# Patient Record
Sex: MALE | Race: WHITE | NOT HISPANIC OR LATINO
[De-identification: names, ages, dates, MRNs, and addresses within clinical notes are randomized per-mention and may not be internally consistent; named-entity substitution may affect disease eponyms.]

---

## 2022-06-04 PROBLEM — Z00.00 ENCOUNTER FOR PREVENTIVE HEALTH EXAMINATION: Status: ACTIVE | Noted: 2022-06-04

## 2022-06-06 ENCOUNTER — NON-APPOINTMENT (OUTPATIENT)
Age: 22
End: 2022-06-06

## 2022-06-06 ENCOUNTER — APPOINTMENT (OUTPATIENT)
Dept: FAMILY MEDICINE | Facility: CLINIC | Age: 22
End: 2022-06-06
Payer: COMMERCIAL

## 2022-06-06 VITALS
SYSTOLIC BLOOD PRESSURE: 114 MMHG | HEIGHT: 70 IN | DIASTOLIC BLOOD PRESSURE: 80 MMHG | BODY MASS INDEX: 30.49 KG/M2 | WEIGHT: 213 LBS | HEART RATE: 66 BPM

## 2022-06-06 DIAGNOSIS — Z83.3 FAMILY HISTORY OF DIABETES MELLITUS: ICD-10-CM

## 2022-06-06 DIAGNOSIS — Z82.49 FAMILY HISTORY OF ISCHEMIC HEART DISEASE AND OTHER DISEASES OF THE CIRCULATORY SYSTEM: ICD-10-CM

## 2022-06-06 DIAGNOSIS — Z87.09 PERSONAL HISTORY OF OTHER DISEASES OF THE RESPIRATORY SYSTEM: ICD-10-CM

## 2022-06-06 DIAGNOSIS — Z78.9 OTHER SPECIFIED HEALTH STATUS: ICD-10-CM

## 2022-06-06 DIAGNOSIS — Z72.3 LACK OF PHYSICAL EXERCISE: ICD-10-CM

## 2022-06-06 DIAGNOSIS — Z76.89 PERSONS ENCOUNTERING HEALTH SERVICES IN OTHER SPECIFIED CIRCUMSTANCES: ICD-10-CM

## 2022-06-06 DIAGNOSIS — L64.9 ANDROGENIC ALOPECIA, UNSPECIFIED: ICD-10-CM

## 2022-06-06 DIAGNOSIS — S39.92XA UNSPECIFIED INJURY OF LOWER BACK, INITIAL ENCOUNTER: ICD-10-CM

## 2022-06-06 DIAGNOSIS — Z87.81 PERSONAL HISTORY OF (HEALED) TRAUMATIC FRACTURE: ICD-10-CM

## 2022-06-06 DIAGNOSIS — S42.302A UNSPECIFIED FRACTURE OF SHAFT OF HUMERUS, LEFT ARM, INITIAL ENCOUNTER FOR CLOSED FRACTURE: ICD-10-CM

## 2022-06-06 DIAGNOSIS — Z83.438 FAMILY HISTORY OF OTHER DISORDER OF LIPOPROTEIN METABOLISM AND OTHER LIPIDEMIA: ICD-10-CM

## 2022-06-06 PROCEDURE — 99203 OFFICE O/P NEW LOW 30 MIN: CPT

## 2022-06-06 NOTE — HEALTH RISK ASSESSMENT
[Yes] : Yes [2 - 4 times a month (2 pts)] : 2-4 times a month (2 points) [3 or 4 (1 pt)] : 3 or 4  (1 point) [Never (0 pts)] : Never (0 points) [No] : In the past 12 months have you used drugs other than those required for medical reasons? No [No falls in past year] : Patient reported no falls in the past year [0] : 2) Feeling down, depressed, or hopeless: Not at all (0) [PHQ-2 Negative - No further assessment needed] : PHQ-2 Negative - No further assessment needed [de-identified] : vap [de-identified] : none [de-identified] : normal [[id number]] : 0

## 2022-06-06 NOTE — REVIEW OF SYSTEMS
[Recent Change In Weight] : ~T recent weight change [Negative] : Constitutional [FreeTextEntry2] : Loss of weight through change of diet. [FreeTextEntry1] : hair loss as per HPI

## 2022-06-06 NOTE — HISTORY OF PRESENT ILLNESS
[FreeTextEntry8] : Mr. LEONARDO KRISHNAN is a 21 year old male here today for hair loss. Started about two years ago. Says his maternal GF is bald. \par Otherwise healthy\par UTD with vaccinations.\par

## 2022-06-06 NOTE — PLAN
[FreeTextEntry1] : F/U for complete physical\par 3 month f/u recommended when he starts finasteride.

## 2022-07-01 ENCOUNTER — APPOINTMENT (OUTPATIENT)
Dept: FAMILY MEDICINE | Facility: CLINIC | Age: 22
End: 2022-07-01

## 2022-07-11 ENCOUNTER — APPOINTMENT (OUTPATIENT)
Dept: FAMILY MEDICINE | Facility: CLINIC | Age: 22
End: 2022-07-11

## 2022-08-15 ENCOUNTER — APPOINTMENT (OUTPATIENT)
Dept: FAMILY MEDICINE | Facility: CLINIC | Age: 22
End: 2022-08-15

## 2023-05-31 ENCOUNTER — NON-APPOINTMENT (OUTPATIENT)
Age: 23
End: 2023-05-31

## 2023-05-31 NOTE — ASSESSMENT
[FreeTextEntry1] : \par \par \par \par \par \par The patient has   NOT COME    to the VISIT \par \par This Assessment  is  in a note Titled:  Non - Appointment    which  only reflects a summary and review of records\par The Assessment below is tentative and preliminary.  It is based only\par on information gathered from chart review and will need modification once the History is taken from the patient and the patient is examined.\par \par \par \par 1. Bloody Diarrhea\par    assoc w\par \par R/O colitis\par       Infectious\par       IBD\par P: \par check the following:\par Labs: cbc, cmet, esr, crp, iron/celiac/IBD/food allergy panels, B12, FA, TSH, \par           Stool: Calprotectin, O/P, C/S, C.Diff, \par \par Recommend: \par * Diet:  Low Fiber,  Low Fat & Lactose free, Low FODMAPs--was reviewed & emphasized\par * Daily fluid intake was reviewed : 6  --  8 cups of decaffeinated fluid daily was emphasized\par * Regular aerobic exercise was emphasized\par * Probiotics  1  daily\par \par blood drawn in the office\par \par \par \par \par \par \par \par Informed Consent:\par * The risks & Benefits of  Colonoscopy were discussed w patient.\par * This included but was not limited to perforation, bleeding, sedation /med rxns, missed lesions possibly requiring surgery, blood transfusions, antibiotics & CPR/Intubation.\par * Pt. understands & agrees to the procedures.\par The following instructions in regards to the prep and medically essential ( cardiac, pulmonary, sz, psych, endocrine)  pre-op medication administration\par was reviewed and emphasized with the patient . \par * Pt. advised to D/C  ASA/NSAIDs  7  Days  PTP.\par * [ +++ ]  Dulcolax / Miralax / Mag. Citrate ,  [     ] Prepopik/ Clenpiq ,  [     ] Osmo Prep,  [    ] GoLytely,  prep. reviewed w Pt.\par * Hold  [           ] AM of procedure.\par * Hold  [           ] PM  before procedure.\par * Take  [           ] PM  before procedure.\par * Take  [           ] AM of procedure.\par \par

## 2023-05-31 NOTE — HISTORY OF PRESENT ILLNESS
[FreeTextEntry1] : \par \par  \par \par \par The patient   DID NOT COME   for the visit.\par \par This HPI is  in a note Titled:  Non - Appointment   and reflects a summary and review of records : including previous and most recent  Labs, body imaging, consults and progress notes, operative and pathology reports, EKG reports, ED records, found in [employer], [employer],  [employer] and any additional records brought in by  the patient at the time of the visit.\par \par It is for History taking purposes\par \par PCP: Helen\par \par 21 yo m w h/o Asthma, LBP\par \par 6/12/23 :    Today:  Feeling well, no c/o , CP, SOB/ LAI, Cough, Wheeze, Palpitations, edema\par \par pt presented to [facility] [place] ED on 4/25/23     c/o bloody diarrhea \par \par \par 6/12/23   Today: \par \par * Abd pain-->no\par * Nausea--> no\par * Vomit--> no\par * Early satiety--> no\par * Belching--> no\par * Hiccups--> no\par * Regurgitation--> no\par * Acid Taste / Water Brash--> no\par * Ht burn--> no\par * Dysphagia--> no\par * Throat Clearing--> no\par * Hoarseness--> no\par * Post-Nasal Drip--> no\par * Congestion--> no\par * Globus--> no\par * Cough--> no\par * Wheeze / PC-> -no\par * BMs: # 4 qd\par * Constipation--> no\par * Diarrhea--> no\par * Bloating--> no\par * Strain on Defecation--> no\par * Incompl Evac--> no\par * Flatulence--> no\par * Gurgling--> no\par * Melena--> no\par * BPBPR-> -no\par * Anorexia--> no\par * Wt. Loss--> no\par \par

## 2023-05-31 NOTE — PHYSICAL EXAM
[Sclera] : the sclera and conjunctiva were normal [Normal Appearance] : the appearance of the neck was normal [None] : no edema [Normal] : normal bowel sounds, non-tender, no masses, soft, no no hepato-splenomegaly [Abnormal Walk] : normal gait [Normal Color / Pigmentation] : normal skin color and pigmentation [Oriented To Time, Place, And Person] : oriented to person, place, and time

## 2023-05-31 NOTE — REVIEW OF SYSTEMS
[As Noted in HPI] : as noted in HPI [Negative] : Respiratory [Scleral Icterus (Yellow Eyes)] : no scleral icterus [Skin Lesions] : no skin lesions [Confused] : no confusion [Easy Bruising] : no tendency for easy bruising

## 2023-06-02 ENCOUNTER — APPOINTMENT (OUTPATIENT)
Dept: GASTROENTEROLOGY | Facility: CLINIC | Age: 23
End: 2023-06-02
Payer: COMMERCIAL

## 2023-06-02 VITALS
SYSTOLIC BLOOD PRESSURE: 132 MMHG | DIASTOLIC BLOOD PRESSURE: 69 MMHG | OXYGEN SATURATION: 98 % | HEIGHT: 71 IN | HEART RATE: 82 BPM | BODY MASS INDEX: 32.2 KG/M2 | WEIGHT: 230 LBS

## 2023-06-02 DIAGNOSIS — R19.7 DIARRHEA, UNSPECIFIED: ICD-10-CM

## 2023-06-02 PROCEDURE — 99204 OFFICE O/P NEW MOD 45 MIN: CPT | Mod: 25

## 2023-06-02 PROCEDURE — 36415 COLL VENOUS BLD VENIPUNCTURE: CPT

## 2023-06-02 NOTE — CONSULT LETTER
[Dear  ___] : Dear  [unfilled], [Consult Letter:] : I had the pleasure of evaluating your patient, [unfilled]. [Please see my note below.] : Please see my note below. [Consult Closing:] : Thank you very much for allowing me to participate in the care of this patient.  If you have any questions, please do not hesitate to contact me. [Sincerely,] : Sincerely, [FreeTextEntry3] : Dylan Bush MD\par tel: [phone]\par fax: [phone]\par

## 2023-06-02 NOTE — PHYSICAL EXAM
[Alert] : alert [Sclera] : the sclera and conjunctiva were normal [Hearing Threshold Finger Rub Not [place]] : hearing was normal [Normal Appearance] : the appearance of the neck was normal [No Respiratory Distress] : no respiratory distress [None] : no edema [Abdomen Soft] : soft [No Focal Deficits] : no focal deficits [Oriented To Time, Place, And Person] : oriented to person, place, and time [de-identified] : Deferred pending colonoscopy

## 2023-06-02 NOTE — ASSESSMENT
[FreeTextEntry1] : Chronic diarrhea: Etiology unclear. Labs / stool studies ordered. Colonoscopy with random biopsies planned\par \par Pertinent available records reviewed\par Risks of the procedure including but not limited to bleeding / perforation / infection / anesthesia complication / missed polyp or lesion explained to the  patient . The patient expressed understanding and a desire to proceed with the procedure.\par \par Risk of not doing procedure includes but is not limited to missed or delayed diagnosis of gastrointestinal pathology.\par A consultation note was provided to the referring provider\par The ordered labs/ bloods  were drawn / collected in my office\par

## 2023-06-04 LAB
ALBUMIN SERPL ELPH-MCNC: 4.6 G/DL
ALP BLD-CCNC: 66 U/L
ALT SERPL-CCNC: 31 U/L
ANION GAP SERPL CALC-SCNC: 17 MMOL/L
AST SERPL-CCNC: 21 U/L
BAKER'S YEAST AB QL: 8 UNITS
BAKER'S YEAST IGA QL IA: 17.9 UNITS
BAKER'S YEAST IGA QN IA: NEGATIVE
BAKER'S YEAST IGG QN IA: NEGATIVE
BILIRUB DIRECT SERPL-MCNC: 0.1 MG/DL
BILIRUB INDIRECT SERPL-MCNC: 0.3 MG/DL
BILIRUB SERPL-MCNC: 0.4 MG/DL
BUN SERPL-MCNC: 15 MG/DL
CALCIUM SERPL-MCNC: 9.9 MG/DL
CDIFF BY PCR: NOT DETECTED
CHLORIDE SERPL-SCNC: 104 MMOL/L
CO2 SERPL-SCNC: 23 MMOL/L
CREAT SERPL-MCNC: 0.9 MG/DL
CRP SERPL-MCNC: 5 MG/L
EGFR: 125 ML/MIN/1.73M2
ERYTHROCYTE [SEDIMENTATION RATE] IN BLOOD BY WESTERGREN METHOD: 34 MM/HR
FOLATE SERPL-MCNC: 5.8 NG/ML
GLUCOSE SERPL-MCNC: 107 MG/DL
POTASSIUM SERPL-SCNC: 4.2 MMOL/L
PROT SERPL-MCNC: 7.6 G/DL
SODIUM SERPL-SCNC: 144 MMOL/L
TSH SERPL-ACNC: 1.63 UIU/ML
VIT B12 SERPL-MCNC: 687 PG/ML

## 2023-06-05 LAB
BACTERIA STL CULT: NORMAL
ENDOMYSIUM IGA SER QL: NEGATIVE
ENDOMYSIUM IGA TITR SER: NORMAL
G LAMBLIA AG STL QL: NORMAL
GLIADIN IGA SER QL: 17.8 UNITS
GLIADIN IGG SER QL: 5.1 UNITS
GLIADIN PEPTIDE IGA SER-ACNC: NEGATIVE
GLIADIN PEPTIDE IGG SER-ACNC: NEGATIVE
TTG IGA SER IA-ACNC: 2.4 U/ML
TTG IGA SER-ACNC: NEGATIVE
TTG IGG SER IA-ACNC: 4 U/ML
TTG IGG SER IA-ACNC: NEGATIVE

## 2023-06-06 LAB — DEPRECATED O AND P PREP STL: NORMAL

## 2023-06-07 LAB
MYELOPEROXIDASE AB SER QL IA: <5 UNITS
MYELOPEROXIDASE CELLS FLD QL: NEGATIVE

## 2023-06-08 LAB — E HISTOLYT AG STL IA-ACNC: NOT DETECTED

## 2023-06-09 ENCOUNTER — NON-APPOINTMENT (OUTPATIENT)
Age: 23
End: 2023-06-09

## 2023-06-12 ENCOUNTER — RESULT REVIEW (OUTPATIENT)
Age: 23
End: 2023-06-12

## 2023-06-12 ENCOUNTER — APPOINTMENT (OUTPATIENT)
Dept: GASTROENTEROLOGY | Facility: CLINIC | Age: 23
End: 2023-06-12

## 2023-06-12 LAB — A1AT STL-MCNC: 0.55 MG/G

## 2023-06-13 ENCOUNTER — APPOINTMENT (OUTPATIENT)
Dept: GASTROENTEROLOGY | Facility: HOSPITAL | Age: 23
End: 2023-06-13

## 2023-06-13 ENCOUNTER — TRANSCRIPTION ENCOUNTER (OUTPATIENT)
Age: 23
End: 2023-06-13

## 2023-06-14 LAB — CALPROTECTIN FECAL: 3370 UG/G

## 2023-06-15 LAB — PANCREATIC ELASTASE, FECAL: 136 CD:794062645

## 2023-06-28 ENCOUNTER — APPOINTMENT (OUTPATIENT)
Dept: GASTROENTEROLOGY | Facility: CLINIC | Age: 23
End: 2023-06-28
Payer: COMMERCIAL

## 2023-06-28 VITALS
SYSTOLIC BLOOD PRESSURE: 128 MMHG | BODY MASS INDEX: 32.2 KG/M2 | DIASTOLIC BLOOD PRESSURE: 82 MMHG | HEIGHT: 71 IN | OXYGEN SATURATION: 98 % | WEIGHT: 230 LBS | HEART RATE: 111 BPM

## 2023-06-28 PROCEDURE — 99214 OFFICE O/P EST MOD 30 MIN: CPT

## 2023-06-28 RX ORDER — FINASTERIDE 1 MG/1
1 TABLET ORAL DAILY
Qty: 90 | Refills: 3 | Status: DISCONTINUED | COMMUNITY
Start: 2022-06-06 | End: 2023-06-28

## 2023-06-28 NOTE — PHYSICAL EXAM
[Alert] : alert [Sclera] : the sclera and conjunctiva were normal [Hearing Threshold Finger Rub Not [place]] : hearing was normal [Normal Appearance] : the appearance of the neck was normal [No Respiratory Distress] : no respiratory distress [None] : no edema [Abdomen Soft] : soft [No Focal Deficits] : no focal deficits [Oriented To Time, Place, And Person] : oriented to person, place, and time [de-identified] : Deferred pending colonoscopy

## 2023-06-28 NOTE — HISTORY OF PRESENT ILLNESS
[FreeTextEntry1] : Presents for follow up after colonoscopy that was c/w Ulcerative Colitis  On steroid taper taper ..currently 10 mg daily with 1-2 formed  /  non urgent / nonbloody / painless bowel movement.\par \par - Colonoscopy 6/15/2023 revealed acute colitis / cryptitis / glandular distortion throughout colon  3  months of watery / semi-solid diarrhea 5-10 times daily  / occasional BRBPR on the toilet paper. \par \par Stool studies were negative for infection\par Calprotectin was 3370 / ESR = 34 / CRP = 5\par \par Steroid taper started after colonoscopy with plan to try Mesalamine \par \par Denies nausea, vomiting, fever, chills, melena, hematemesis, unexpected weight loss, reflux\par

## 2023-06-28 NOTE — CONSULT LETTER
[Dear  ___] : Dear  [unfilled], [Courtesy Letter:] : I had the pleasure of seeing your patient, [unfilled], in my office today. [Please see my note below.] : Please see my note below. [Consult Closing:] : Thank you very much for allowing me to participate in the care of this patient.  If you have any questions, please do not hesitate to contact me. [Sincerely,] : Sincerely, [FreeTextEntry3] : Dylan Bush MD\par tel: [phone]\par fax: [phone]\par

## 2023-07-30 ENCOUNTER — NON-APPOINTMENT (OUTPATIENT)
Age: 23
End: 2023-07-30

## 2023-07-31 ENCOUNTER — APPOINTMENT (OUTPATIENT)
Age: 23
End: 2023-07-31
Payer: COMMERCIAL

## 2023-08-16 LAB
CDIFF BY PCR: NOT DETECTED
GI PCR PANEL: NOT DETECTED

## 2023-08-27 LAB — CALPROTECTIN FECAL: 1100 UG/G

## 2023-08-28 ENCOUNTER — APPOINTMENT (OUTPATIENT)
Dept: GASTROENTEROLOGY | Facility: CLINIC | Age: 23
End: 2023-08-28
Payer: COMMERCIAL

## 2023-08-28 DIAGNOSIS — K52.9 NONINFECTIVE GASTROENTERITIS AND COLITIS, UNSPECIFIED: ICD-10-CM

## 2023-08-28 PROCEDURE — 99443: CPT

## 2023-08-28 NOTE — HISTORY OF PRESENT ILLNESS
[FreeTextEntry1] : Telephonic visit:  Patient back to candice stools after 2 nd steroid taper since diagnosis. Patient started on a steroid taper on August 14, 2023 (increased watery / non bloody stools ) .  Stool studies were negative for C. diff / calprotectin was 1100.  At 6/2023 evaluation after colonoscopy that was c/w Ulcerative Colitis was on n steroid taper with 1-2 formed / non urgent / non bloody / painless bowel movement. Apriso started with plan to check calprotectin in 3 months  - Colonoscopy 6/15/2023 revealed acute colitis / cryptitis / glandular distortion throughout colon 3 months of watery / semi-solid diarrhea 5-10 times daily / occasional BRBPR on the toilet paper.   Stool studies were negative for infection. Calprotectin was 3370 / ESR = 34 / CRP = 5  Steroid taper started after colonoscopy with plan to try Mesalamine   Denies nausea, vomiting, fever, chills, melena, hematemesis, unexpected weight loss, reflux

## 2023-08-28 NOTE — ASSESSMENT
[FreeTextEntry1] : Ulcerative Colitis..responding to steroid taper # 2. Continue Apriso...if flares again, will need to consider a biolgic Office in 1  month / calprotectin in 3 months Labs every 6 months Colonoscopy every 1-2 years after 8 years of disease  Pertinent available records reviewed

## 2023-09-07 RX ORDER — PREDNISONE 10 MG/1
10 TABLET ORAL
Qty: 58 | Refills: 0 | Status: ACTIVE | COMMUNITY
Start: 2023-06-13 | End: 1900-01-01

## 2023-09-21 ENCOUNTER — LABORATORY RESULT (OUTPATIENT)
Age: 23
End: 2023-09-21

## 2023-09-28 ENCOUNTER — LABORATORY RESULT (OUTPATIENT)
Age: 23
End: 2023-09-28

## 2023-10-11 ENCOUNTER — APPOINTMENT (OUTPATIENT)
Dept: GASTROENTEROLOGY | Facility: CLINIC | Age: 23
End: 2023-10-11

## 2023-10-11 RX ORDER — MESALAMINE 0.38 G/1
0.38 CAPSULE, EXTENDED RELEASE ORAL
Qty: 120 | Refills: 3 | Status: ACTIVE | COMMUNITY
Start: 2023-06-28 | End: 1900-01-01

## 2023-10-11 RX ORDER — PREDNISONE 10 MG/1
10 TABLET ORAL DAILY
Qty: 30 | Refills: 0 | Status: ACTIVE | COMMUNITY
Start: 2023-10-11 | End: 1900-01-01

## 2023-10-11 RX ORDER — MESALAMINE 1.2 G/1
1.2 TABLET, DELAYED RELEASE ORAL DAILY
Qty: 60 | Refills: 6 | Status: DISCONTINUED | COMMUNITY
Start: 2023-06-28 | End: 2023-10-11

## 2023-10-19 ENCOUNTER — NON-APPOINTMENT (OUTPATIENT)
Age: 23
End: 2023-10-19

## 2023-10-24 ENCOUNTER — APPOINTMENT (OUTPATIENT)
Dept: GASTROENTEROLOGY | Facility: CLINIC | Age: 23
End: 2023-10-24
Payer: COMMERCIAL

## 2023-10-24 VITALS
HEIGHT: 69 IN | DIASTOLIC BLOOD PRESSURE: 80 MMHG | OXYGEN SATURATION: 96 % | HEART RATE: 129 BPM | SYSTOLIC BLOOD PRESSURE: 124 MMHG

## 2023-10-24 PROCEDURE — 99214 OFFICE O/P EST MOD 30 MIN: CPT

## 2023-10-24 RX ORDER — PREDNISONE 2.5 MG/1
2.5 TABLET ORAL DAILY
Qty: 5 | Refills: 0 | Status: ACTIVE | COMMUNITY
Start: 2023-10-24 | End: 1900-01-01

## 2023-10-24 RX ORDER — PREDNISONE 5 MG/1
5 TABLET ORAL DAILY
Qty: 5 | Refills: 0 | Status: ACTIVE | COMMUNITY
Start: 2023-10-24 | End: 1900-01-01

## 2023-10-24 RX ORDER — MESALAMINE 0.38 G/1
0.38 CAPSULE, EXTENDED RELEASE ORAL
Qty: 120 | Refills: 0 | Status: ACTIVE | COMMUNITY
Start: 2023-10-24 | End: 1900-01-01

## 2023-11-08 ENCOUNTER — APPOINTMENT (OUTPATIENT)
Dept: GASTROENTEROLOGY | Facility: CLINIC | Age: 23
End: 2023-11-08

## 2023-11-14 RX ORDER — ADALIMUMAB 40MG/0.4ML
40 KIT SUBCUTANEOUS
Qty: 1 | Refills: 6 | Status: ACTIVE | COMMUNITY
Start: 2023-10-02 | End: 1900-01-01

## 2023-11-21 ENCOUNTER — APPOINTMENT (OUTPATIENT)
Dept: GASTROENTEROLOGY | Facility: CLINIC | Age: 23
End: 2023-11-21

## 2023-11-22 ENCOUNTER — NON-APPOINTMENT (OUTPATIENT)
Age: 23
End: 2023-11-22

## 2023-11-22 ENCOUNTER — APPOINTMENT (OUTPATIENT)
Dept: GASTROENTEROLOGY | Facility: CLINIC | Age: 23
End: 2023-11-22

## 2023-11-30 ENCOUNTER — LABORATORY RESULT (OUTPATIENT)
Age: 23
End: 2023-11-30

## 2023-12-15 NOTE — HISTORY OF PRESENT ILLNESS
[FreeTextEntry1] : LEONARDO KRISHNAN  is being evaluated at the request of Dr. Salgado  for an opinion re:diarrhea.  Presents  with 3  months of watery / semi-solid diarrhea 5-10 times daily . Admits to nocturnal diarrhea as well. Admits to occasional BRBPR on the toilet paper. Admits to cramping prior to diarrhea. Admits to urgency as well. No recent travel / antibiotics / new meds. Diarrhea controlle dwith Imodium\par \par Denies nausea, vomiting, fever, chills, melena, hematemesis, unexpected weight loss, reflux\par  No abnormalities

## 2024-03-04 ENCOUNTER — RESULT REVIEW (OUTPATIENT)
Age: 24
End: 2024-03-04

## 2024-03-05 ENCOUNTER — APPOINTMENT (OUTPATIENT)
Dept: GASTROENTEROLOGY | Facility: HOSPITAL | Age: 24
End: 2024-03-05

## 2024-04-12 PROBLEM — K51.90 ULCERATIVE COLITIS: Status: ACTIVE | Noted: 2023-06-28

## 2024-04-12 NOTE — HISTORY OF PRESENT ILLNESS
[FreeTextEntry1] : Presents for follow up. Ulcerative colitis  diagnosed 6/2023. Started on Humira ( 10/24/23) secondary to recurrent sxs on Apriso requiring several steroid tapers.. On August 14, 2023 (increased watery / non bloody stools ) . Stool studies were negative for C. diff / calprotectin was 1100.  -Colonoscopy 3/2024: grasnularity  / friabilty  / erythema throughout ( symptomaticaslly feeling well  FINAL DIAGNOSIS A. TERMINAL ILEUM, BIOPSY: - Small intestinal mucosa showing lymphoid aggregates in the lamina propria consistent with terminal ileum without significant histopathologic abnormalities.  B. ASCENDING COLON, BIOPSY: - Fragments of benign colonic mucosa showing lymphoid aggregates and abundant eosinophils in the lamina propria showing focal glandular architectural distortion and reactive changes.  C. TRANSVERSE COLON, BIOPSY: - Fragments of benign colonic mucosa showing lymphoid aggregates and abundant eosinophils in the lamina propria showing focal glandular architectural distortion and reactive changes.  D. DESCENDING COLON, BIOPSY: - Fragments of benign colonic mucosa with abundant eosinophils in the lamina propria and surface epithelium showing focal eosinophilic abscess formation and reactive changes. - Lymphoid follicles in the lamina propria.  E. 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 - Lymphoid follicles in the lamina propria.  F. 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 - Lymphoid follicles in the lamina propria.    At 6/2023 evaluation after colonoscopy that was c/w Ulcerative Colitis was on steroid taper with 1-2 formed / non urgent / non bloody / painless bowel movement. Apriso started with plan to check calprotectin in 3 months  Initial diagnosis: - Colonoscopy 6/15/2023 revealed acute colitis / cryptitis / glandular distortion throughout colon 3 months of watery / semi-solid diarrhea 5-10 times daily / occasional BRBPR on the toilet paper.  Stool studies were negative for infection. / Calprotectin was 3370 / ESR = 34 / CRP = 5 just prior to initial colonoscopy  Steroid taper started after colonoscopy with plan to try Mesalamine  Denies nausea, vomiting, fever, chills, melena, hematemesis, unexpected weight loss, reflux

## 2024-04-17 ENCOUNTER — APPOINTMENT (OUTPATIENT)
Dept: GASTROENTEROLOGY | Facility: CLINIC | Age: 24
End: 2024-04-17

## 2024-04-17 DIAGNOSIS — K51.90 ULCERATIVE COLITIS, UNSPECIFIED, W/OUT COMPLICATIONS: ICD-10-CM

## 2024-04-25 ENCOUNTER — NON-APPOINTMENT (OUTPATIENT)
Age: 24
End: 2024-04-25

## 2024-05-22 ENCOUNTER — RX RENEWAL (OUTPATIENT)
Age: 24
End: 2024-05-22

## 2024-05-22 RX ORDER — ADALIMUMAB 40MG/0.4ML
40 KIT SUBCUTANEOUS
Qty: 1 | Refills: 6 | Status: ACTIVE | COMMUNITY
Start: 2023-11-21 | End: 1900-01-01

## 2024-12-10 ENCOUNTER — RX RENEWAL (OUTPATIENT)
Age: 24
End: 2024-12-10

## 2024-12-10 RX ORDER — ADALIMUMAB 40MG/0.4ML
40 KIT SUBCUTANEOUS
Qty: 1 | Refills: 6 | Status: ACTIVE | COMMUNITY
Start: 2024-12-10 | End: 1900-01-01

## 2025-03-12 ENCOUNTER — NON-APPOINTMENT (OUTPATIENT)
Age: 25
End: 2025-03-12

## 2025-03-16 ENCOUNTER — NON-APPOINTMENT (OUTPATIENT)
Age: 25
End: 2025-03-16